# Patient Record
Sex: MALE | Race: WHITE | NOT HISPANIC OR LATINO | URBAN - NONMETROPOLITAN AREA
[De-identification: names, ages, dates, MRNs, and addresses within clinical notes are randomized per-mention and may not be internally consistent; named-entity substitution may affect disease eponyms.]

---

## 2019-03-19 ENCOUNTER — IMPORTED ENCOUNTER (OUTPATIENT)
Dept: URBAN - NONMETROPOLITAN AREA CLINIC 1 | Facility: CLINIC | Age: 84
End: 2019-03-19

## 2019-03-19 PROBLEM — H11.32: Noted: 2019-03-19

## 2019-03-19 PROBLEM — Z96.1: Noted: 2019-03-19

## 2019-03-19 PROBLEM — H02.831: Noted: 2019-03-19

## 2019-03-19 PROBLEM — H43.813: Noted: 2019-03-19

## 2019-03-19 PROBLEM — H02.834: Noted: 2019-03-19

## 2019-03-19 PROCEDURE — 92012 INTRM OPH EXAM EST PATIENT: CPT

## 2019-03-19 NOTE — PATIENT DISCUSSION
IOL OU - doing very well. Use +2.50 for reading. Get +1.50 or +1.75 for reading sheet music. Plays guitar. 3/19/2019  Discussed [employer] 62 LE and reassured. Discussed eye safety. RTC prn.; 's Notes: Recently fell out of bed and hurt shoulder.

## 2019-07-17 ENCOUNTER — IMPORTED ENCOUNTER (OUTPATIENT)
Dept: URBAN - NONMETROPOLITAN AREA CLINIC 1 | Facility: CLINIC | Age: 84
End: 2019-07-17

## 2019-07-17 PROBLEM — H02.831: Noted: 2019-07-17

## 2019-07-17 PROBLEM — H43.813: Noted: 2019-07-17

## 2019-07-17 PROBLEM — Z96.1: Noted: 2019-07-17

## 2019-07-17 PROBLEM — H02.834: Noted: 2019-07-17

## 2019-07-17 PROCEDURE — 92014 COMPRE OPH EXAM EST PT 1/>: CPT

## 2019-07-17 NOTE — PATIENT DISCUSSION
IOL OU - doing very well. Use +2.50 for reading. Get +1.50 or +1.75 for reading sheet music. Plays [employer]. BF rx given.; 's Notes: Recently fell out of bed and hurt shoulder.

## 2020-09-15 ENCOUNTER — IMPORTED ENCOUNTER (OUTPATIENT)
Dept: URBAN - NONMETROPOLITAN AREA CLINIC 1 | Facility: CLINIC | Age: 85
End: 2020-09-15

## 2020-09-15 PROBLEM — Z96.1: Noted: 2020-09-15

## 2020-09-15 PROBLEM — H43.813: Noted: 2020-09-15

## 2020-09-15 PROBLEM — H02.834: Noted: 2019-07-17

## 2020-09-15 PROBLEM — H02.831: Noted: 2019-07-17

## 2020-09-15 PROCEDURE — 92014 COMPRE OPH EXAM EST PT 1/>: CPT

## 2020-09-15 NOTE — PATIENT DISCUSSION
s/p PCIOL-Stable PCIOL OU.-Monitor for PCO. -RTC . PVD-Retina flat 360 with no breaks tears or heme.-S&S of RD/RT reviewed with pt.-Stressed that pt should contact our office right away with any changes or increase in symptoms.

## 2022-04-10 ASSESSMENT — VISUAL ACUITY
OD_CC: 20/30-2
OS_CC: 20/25
OD_CC: 20/30
OD_CC: 20/30
OS_SC: J1
OS_CC: 20/25
OD_SC: J1
OU_CC: J1+
OS_CC: 20/30-2

## 2022-04-10 ASSESSMENT — TONOMETRY
OS_IOP_MMHG: 13
OD_IOP_MMHG: 13
OD_IOP_MMHG: 13
OS_IOP_MMHG: 13
OS_IOP_MMHG: 13
OD_IOP_MMHG: 13

## 2024-03-11 ENCOUNTER — NEW PATIENT (OUTPATIENT)
Dept: RURAL CLINIC 1 | Facility: CLINIC | Age: 89
End: 2024-03-11

## 2024-03-11 DIAGNOSIS — H02.834: ICD-10-CM

## 2024-03-11 DIAGNOSIS — H43.813: ICD-10-CM

## 2024-03-11 DIAGNOSIS — Z96.1: ICD-10-CM

## 2024-03-11 DIAGNOSIS — H02.831: ICD-10-CM

## 2024-03-11 PROCEDURE — 92004 COMPRE OPH EXAM NEW PT 1/>: CPT

## 2024-03-11 ASSESSMENT — VISUAL ACUITY
OU_SC: 20/30
OS_SC: 20/30
OD_SC: 20/30